# Patient Record
Sex: MALE | Race: OTHER | HISPANIC OR LATINO | Employment: UNEMPLOYED | URBAN - METROPOLITAN AREA
[De-identification: names, ages, dates, MRNs, and addresses within clinical notes are randomized per-mention and may not be internally consistent; named-entity substitution may affect disease eponyms.]

---

## 2021-09-24 ENCOUNTER — APPOINTMENT (EMERGENCY)
Dept: RADIOLOGY | Facility: HOSPITAL | Age: 76
End: 2021-09-24
Payer: COMMERCIAL

## 2021-09-24 ENCOUNTER — HOSPITAL ENCOUNTER (EMERGENCY)
Facility: HOSPITAL | Age: 76
Discharge: HOME | End: 2021-09-24
Attending: EMERGENCY MEDICINE | Admitting: EMERGENCY MEDICINE
Payer: COMMERCIAL

## 2021-09-24 VITALS
HEART RATE: 62 BPM | SYSTOLIC BLOOD PRESSURE: 175 MMHG | OXYGEN SATURATION: 98 % | WEIGHT: 160.27 LBS | DIASTOLIC BLOOD PRESSURE: 78 MMHG | TEMPERATURE: 98.9 F | RESPIRATION RATE: 18 BRPM

## 2021-09-24 DIAGNOSIS — N17.9 AKI (ACUTE KIDNEY INJURY) (HCC): ICD-10-CM

## 2021-09-24 DIAGNOSIS — U07.1 COVID-19 VIRUS INFECTION: Primary | ICD-10-CM

## 2021-09-24 DIAGNOSIS — I10 HYPERTENSION: ICD-10-CM

## 2021-09-24 DIAGNOSIS — R73.9 HYPERGLYCEMIA: ICD-10-CM

## 2021-09-24 LAB
ALBUMIN SERPL BCP-MCNC: 3.6 G/DL (ref 3.5–5)
ALP SERPL-CCNC: 75 U/L (ref 46–116)
ALT SERPL W P-5'-P-CCNC: 26 U/L (ref 12–78)
ANION GAP SERPL CALCULATED.3IONS-SCNC: 7 MMOL/L (ref 4–13)
AST SERPL W P-5'-P-CCNC: 21 U/L (ref 5–45)
BACTERIA UR QL AUTO: ABNORMAL /HPF
BASOPHILS # BLD AUTO: 0.02 THOUSANDS/ΜL (ref 0–0.1)
BASOPHILS NFR BLD AUTO: 0 % (ref 0–1)
BILIRUB SERPL-MCNC: 0.22 MG/DL (ref 0.2–1)
BILIRUB UR QL STRIP: NEGATIVE
BUN SERPL-MCNC: 34 MG/DL (ref 5–25)
CALCIUM SERPL-MCNC: 9 MG/DL (ref 8.3–10.1)
CHLORIDE SERPL-SCNC: 100 MMOL/L (ref 100–108)
CLARITY UR: CLEAR
CO2 SERPL-SCNC: 27 MMOL/L (ref 21–32)
COLOR UR: YELLOW
CREAT SERPL-MCNC: 1.73 MG/DL (ref 0.6–1.3)
EOSINOPHIL # BLD AUTO: 0.09 THOUSAND/ΜL (ref 0–0.61)
EOSINOPHIL NFR BLD AUTO: 1 % (ref 0–6)
ERYTHROCYTE [DISTWIDTH] IN BLOOD BY AUTOMATED COUNT: 13 % (ref 11.6–15.1)
GFR SERPL CREATININE-BSD FRML MDRD: 37 ML/MIN/1.73SQ M
GLUCOSE SERPL-MCNC: 237 MG/DL (ref 65–140)
GLUCOSE UR STRIP-MCNC: ABNORMAL MG/DL
HCT VFR BLD AUTO: 41.6 % (ref 36.5–49.3)
HGB BLD-MCNC: 13.5 G/DL (ref 12–17)
HGB UR QL STRIP.AUTO: ABNORMAL
IMM GRANULOCYTES # BLD AUTO: 0.02 THOUSAND/UL (ref 0–0.2)
IMM GRANULOCYTES NFR BLD AUTO: 0 % (ref 0–2)
KETONES UR STRIP-MCNC: NEGATIVE MG/DL
LEUKOCYTE ESTERASE UR QL STRIP: NEGATIVE
LIPASE SERPL-CCNC: 200 U/L (ref 73–393)
LYMPHOCYTES # BLD AUTO: 1.36 THOUSANDS/ΜL (ref 0.6–4.47)
LYMPHOCYTES NFR BLD AUTO: 19 % (ref 14–44)
MCH RBC QN AUTO: 28.5 PG (ref 26.8–34.3)
MCHC RBC AUTO-ENTMCNC: 32.5 G/DL (ref 31.4–37.4)
MCV RBC AUTO: 88 FL (ref 82–98)
MONOCYTES # BLD AUTO: 0.71 THOUSAND/ΜL (ref 0.17–1.22)
MONOCYTES NFR BLD AUTO: 10 % (ref 4–12)
NEUTROPHILS # BLD AUTO: 4.98 THOUSANDS/ΜL (ref 1.85–7.62)
NEUTS SEG NFR BLD AUTO: 70 % (ref 43–75)
NITRITE UR QL STRIP: NEGATIVE
NON-SQ EPI CELLS URNS QL MICRO: ABNORMAL /HPF
NRBC BLD AUTO-RTO: 0 /100 WBCS
PH UR STRIP.AUTO: 5.5 [PH] (ref 4.5–8)
PLATELET # BLD AUTO: 167 THOUSANDS/UL (ref 149–390)
PMV BLD AUTO: 11.5 FL (ref 8.9–12.7)
POTASSIUM SERPL-SCNC: 5.1 MMOL/L (ref 3.5–5.3)
PROT SERPL-MCNC: 7.5 G/DL (ref 6.4–8.2)
PROT UR STRIP-MCNC: ABNORMAL MG/DL
RBC # BLD AUTO: 4.73 MILLION/UL (ref 3.88–5.62)
RBC #/AREA URNS AUTO: ABNORMAL /HPF
SARS-COV-2 RNA RESP QL NAA+PROBE: POSITIVE
SODIUM SERPL-SCNC: 134 MMOL/L (ref 136–145)
SP GR UR STRIP.AUTO: 1.02 (ref 1–1.03)
TROPONIN I SERPL-MCNC: <0.02 NG/ML
UROBILINOGEN UR QL STRIP.AUTO: 0.2 E.U./DL
WBC # BLD AUTO: 7.18 THOUSAND/UL (ref 4.31–10.16)
WBC #/AREA URNS AUTO: ABNORMAL /HPF

## 2021-09-24 PROCEDURE — 71045 X-RAY EXAM CHEST 1 VIEW: CPT

## 2021-09-24 PROCEDURE — 99285 EMERGENCY DEPT VISIT HI MDM: CPT | Performed by: PHYSICIAN ASSISTANT

## 2021-09-24 PROCEDURE — 85025 COMPLETE CBC W/AUTO DIFF WBC: CPT | Performed by: PHYSICIAN ASSISTANT

## 2021-09-24 PROCEDURE — 99284 EMERGENCY DEPT VISIT MOD MDM: CPT

## 2021-09-24 PROCEDURE — 93005 ELECTROCARDIOGRAM TRACING: CPT

## 2021-09-24 PROCEDURE — 84484 ASSAY OF TROPONIN QUANT: CPT | Performed by: PHYSICIAN ASSISTANT

## 2021-09-24 PROCEDURE — 36415 COLL VENOUS BLD VENIPUNCTURE: CPT | Performed by: PHYSICIAN ASSISTANT

## 2021-09-24 PROCEDURE — 83690 ASSAY OF LIPASE: CPT | Performed by: PHYSICIAN ASSISTANT

## 2021-09-24 PROCEDURE — 81001 URINALYSIS AUTO W/SCOPE: CPT

## 2021-09-24 PROCEDURE — U0003 INFECTIOUS AGENT DETECTION BY NUCLEIC ACID (DNA OR RNA); SEVERE ACUTE RESPIRATORY SYNDROME CORONAVIRUS 2 (SARS-COV-2) (CORONAVIRUS DISEASE [COVID-19]), AMPLIFIED PROBE TECHNIQUE, MAKING USE OF HIGH THROUGHPUT TECHNOLOGIES AS DESCRIBED BY CMS-2020-01-R: HCPCS | Performed by: PHYSICIAN ASSISTANT

## 2021-09-24 PROCEDURE — 80053 COMPREHEN METABOLIC PANEL: CPT | Performed by: PHYSICIAN ASSISTANT

## 2021-09-24 PROCEDURE — U0005 INFEC AGEN DETEC AMPLI PROBE: HCPCS | Performed by: PHYSICIAN ASSISTANT

## 2021-09-24 RX ORDER — MULTIVITAMIN
1 TABLET ORAL DAILY
Qty: 30 TABLET | Refills: 0 | Status: SHIPPED | OUTPATIENT
Start: 2021-09-24

## 2021-09-24 RX ORDER — MELATONIN
2000 DAILY
Qty: 30 TABLET | Refills: 0 | Status: SHIPPED | OUTPATIENT
Start: 2021-09-24

## 2021-09-24 RX ORDER — MULTIVIT WITH MINERALS/LUTEIN
1000 TABLET ORAL DAILY
Qty: 30 TABLET | Refills: 0 | Status: SHIPPED | OUTPATIENT
Start: 2021-09-24

## 2021-09-25 LAB
ATRIAL RATE: 63 BPM
P AXIS: 52 DEGREES
PR INTERVAL: 160 MS
QRS AXIS: 50 DEGREES
QRSD INTERVAL: 82 MS
QT INTERVAL: 390 MS
QTC INTERVAL: 399 MS
T WAVE AXIS: 58 DEGREES
VENTRICULAR RATE: 63 BPM

## 2021-09-25 PROCEDURE — 93010 ELECTROCARDIOGRAM REPORT: CPT | Performed by: INTERNAL MEDICINE

## 2021-09-25 NOTE — ED PROVIDER NOTES
History  Chief Complaint   Patient presents with    Abdominal Pain     pt  reports abdominal pain, fatique and fever for the past three days  pt  denies N/V/D  Patient is a 67 y/o male with hx of HTN and DM, presents to the ED for evaluation of fever, cough, body aches, fatigue, abdominal pain  Pt with son who is translating for Nigerian speaking patient states patient began with symptoms 3 days ago  Pt is vaccinated against COVID, no known sick contacts  Pt reports diffuse abdominal pain when he coughs  Pt reports mild chest discomfort as well when he coughs  Pt denies hemoptysis  Pt has had decreased appetite and oral intake  Pt without SOB, vomiting, diarrhea, headache, vision changes, focal weakness, sensory deficit, dizziness  None       Past Medical History:   Diagnosis Date    Diabetes mellitus ([facility] [address] )     Hypertension        History reviewed  No pertinent surgical history  History reviewed  No pertinent family history  I have reviewed and agree with the history as documented  E-Cigarette/Vaping     E-Cigarette/Vaping Substances     Social History     Tobacco Use    Smoking status: Never Smoker    Smokeless tobacco: Never Used   Substance Use Topics    Alcohol use: Not Currently    Drug use: Not Currently       Review of Systems   Constitutional: Positive for appetite change, fatigue and fever  HENT: Negative for congestion, ear pain and sore throat  Eyes: Negative for visual disturbance  Respiratory: Positive for cough  Negative for shortness of breath  Cardiovascular: Negative for chest pain  Gastrointestinal: Positive for abdominal pain (when coughs)  Negative for diarrhea, nausea and vomiting  Genitourinary: Negative for dysuria, flank pain and hematuria  Musculoskeletal: Negative for back pain and neck pain  Skin: Negative for rash  Neurological: Negative for dizziness, speech difficulty, weakness and headaches     Psychiatric/Behavioral: Negative for confusion  Physical Exam  Physical Exam  Constitutional:       General: He is not in acute distress  Appearance: He is well-developed  He is not ill-appearing, toxic-appearing or diaphoretic  HENT:      Head: Normocephalic and atraumatic  Right Ear: External ear normal       Left Ear: External ear normal       Nose: Nose normal       Mouth/Throat:      Lips: Pink  Mouth: Mucous membranes are moist    Eyes:      Extraocular Movements: Extraocular movements intact  Conjunctiva/sclera: Conjunctivae normal    Cardiovascular:      Rate and Rhythm: Normal rate and regular rhythm  Pulmonary:      Effort: Pulmonary effort is normal  No tachypnea or respiratory distress  Breath sounds: Normal breath sounds  No decreased breath sounds, wheezing, rhonchi or rales  Abdominal:      General: Abdomen is flat  There is no distension  Palpations: Abdomen is soft  Tenderness: There is no abdominal tenderness  There is no guarding or rebound  Musculoskeletal:      Cervical back: Normal range of motion and neck supple  Right lower leg: Normal  No swelling, tenderness or bony tenderness  No edema  Left lower leg: Normal  No swelling, tenderness or bony tenderness  No edema  Skin:     General: Skin is warm and dry  Capillary Refill: Capillary refill takes less than 2 seconds  Coloration: Skin is not jaundiced or pale  Findings: No rash  Neurological:      Mental Status: He is alert and oriented to person, place, and time  GCS: GCS eye subscore is 4  GCS verbal subscore is 5  GCS motor subscore is 6     Psychiatric:         Mood and Affect: Mood and affect normal          Speech: Speech normal          Vital Signs  ED Triage Vitals [09/24/21 1831]   Temperature Pulse Respirations Blood Pressure SpO2   98 9 °F (37 2 °C) 69 20 (!) 182/79 98 %      Temp Source Heart Rate Source Patient Position - Orthostatic VS BP Location FiO2 (%)   Oral Monitor Sitting Right arm --      Pain Score       7           Vitals:    09/24/21 1831 09/24/21 2034 09/24/21 2253   BP: (!) 182/79 (!) 194/84 (!) 175/78   Pulse: 69 67 62   Patient Position - Orthostatic VS: Sitting Sitting Lying         Visual Acuity      ED Medications  Medications - No data to display    Diagnostic Studies  Results Reviewed     Procedure Component Value Units Date/Time    Comprehensive metabolic panel [[id number]]  (Abnormal) Collected: 09/24/21 2124    Lab Status: Final result Specimen: Blood from Hand, Right Updated: 09/24/21 2228     Sodium 134 mmol/L      Potassium 5 1 mmol/L      Chloride 100 mmol/L      CO2 27 mmol/L      ANION GAP 7 mmol/L      BUN 34 mg/dL      Creatinine 1 73 mg/dL      Glucose 237 mg/dL      Calcium 9 0 mg/dL      AST 21 U/L      ALT 26 U/L      Alkaline Phosphatase 75 U/L      Total Protein 7 5 g/dL      Albumin 3 6 g/dL      Total Bilirubin 0 22 mg/dL      eGFR 37 ml/min/1 73sq m     Narrative:      [place] guidelines for Chronic Kidney Disease (CKD):     Stage 1 with normal or high GFR (GFR > 90 mL/min/1 73 square meters)    Stage 2 Mild CKD (GFR = 60-89 mL/min/1 73 square meters)    Stage 3A Moderate CKD (GFR = 45-59 mL/min/1 73 square meters)    Stage 3B Moderate CKD (GFR = 30-44 mL/min/1 73 square meters)    Stage 4 Severe CKD (GFR = 15-29 mL/min/1 73 square meters)    Stage 5 End Stage CKD (GFR <15 mL/min/1 73 square meters)  Note: GFR calculation is accurate only with a steady state creatinine    Lipase [[id number]]  (Normal) Collected: 09/24/21 2124    Lab Status: Final result Specimen: Blood from Hand, Right Updated: 09/24/21 2228     Lipase 200 u/L     Urine Microscopic [[id number]]  (Abnormal) Collected: 09/24/21 2050    Lab Status: Final result Specimen: Urine, Clean Catch Updated: 09/24/21 2145     RBC, UA 0-1 /hpf      WBC, UA 0-1 /hpf      Epithelial Cells None Seen /hpf      Bacteria, UA None Seen /hpf     Novel Coronavirus (Covid-19),PCR [facility] - 2 Hour Stat [[id number]]  (Abnormal) Collected: 09/24/21 2046    Lab Status: Final result Specimen: Nasopharyngeal Swab Updated: 09/24/21 2125     SARS-CoV-2 Positive    Narrative:      FOR PEDIATRIC PATIENTS - copy/paste COVID Guidelines URL to browser: [email or web address]  [employer]    The specimen collection materials, transport medium, and/or testing methodology utilized in the production of these test results have been proven to be reliable in a limited validation with an abbreviated program under the Emergency Utilization Authorization provided by the FDA  Testing reported as "Presumptive positive" will be confirmed with secondary testing to ensure result accuracy  Clinical caution and judgement should be used with the interpretation of these results with consideration of the clinical impression and other laboratory testing  Testing reported as "Positive" or "Negative" has been proven to be accurate according to standard laboratory validation requirements  All testing is performed with control materials showing appropriate reactivity at standard intervals      Troponin I [[id number]]  (Normal) Collected: 09/24/21 2045    Lab Status: Final result Specimen: Blood from Hand, Right Updated: 09/24/21 2112     Troponin I <0 02 ng/mL     CBC and differential [[id number]] Collected: 09/24/21 2045    Lab Status: Final result Specimen: Blood from Hand, Right Updated: 09/24/21 2052     WBC 7 18 Thousand/uL      RBC 4 73 Million/uL      Hemoglobin 13 5 g/dL      Hematocrit 41 6 %      MCV 88 fL      MCH 28 5 pg      MCHC 32 5 g/dL      RDW 13 0 %      MPV 11 5 fL      Platelets 949 Thousands/uL      nRBC 0 /100 WBCs      Neutrophils Relative 70 %      Immat GRANS % 0 %      Lymphocytes Relative 19 %      Monocytes Relative 10 %      Eosinophils Relative 1 %      Basophils Relative 0 %      Neutrophils Absolute 4 98 Thousands/µL      Immature Grans Absolute 0 02 Thousand/uL      Lymphocytes Absolute 1 36 Thousands/µL      Monocytes Absolute 0 71 Thousand/µL      Eosinophils Absolute 0 09 Thousand/µL      Basophils Absolute 0 02 Thousands/µL     Urine Macroscopic, POC [[id number]]  (Abnormal) Collected: 09/24/21 2050    Lab Status: Final result Specimen: Urine Updated: 09/24/21 2051     Color, UA Yellow     Clarity, UA Clear     pH, UA 5 5     Leukocytes, UA Negative     Nitrite, UA Negative     Protein, UA Trace mg/dl      Glucose,  (1/2%) mg/dl      Ketones, UA Negative mg/dl      Urobilinogen, UA 0 2 E U /dl      Bilirubin, UA Negative     Blood, UA Trace     Specific [employer], UA 1 025    Narrative:      [facility] RESULT                 XR chest 1 view portable   Final Result by Deidra Whitney MD (09/25 1008)      Clear lungs  In the setting of clinically suspected/proven COVID-19, this plain film appearance does not contain findings that raise concern for viral pneumonia such as COVID-19, but does not rule out this diagnosis  Workstation performed: [id number]                    Procedures  ECG 12 Lead Documentation Only    Date/Time: 9/24/2021 9:11 PM  Performed by: Marge Brink PA-C  Authorized by: Marge Brink PA-C     Indications / Diagnosis:  Cp  ECG reviewed by me, the ED Provider: yes    Patient location:  ED  Previous ECG:     Previous ECG:  Unavailable    Comparison to cardiac monitor: Yes    Interpretation:     Interpretation: abnormal    Quality:     Tracing quality:  Limited by artifact  Rate:     ECG rate:  63    ECG rate assessment: normal    Rhythm:     Rhythm: sinus rhythm    Ectopy:     Ectopy: none    QRS:     QRS axis:  Normal    QRS intervals:  Normal  Conduction:     Conduction: normal    ST segments:     ST segments:  Normal  T waves:     T waves: normal    Comments:      QT/QTc: 390/399   No STEMI             ED Course  ED Course as of Sep 28 1056   Fri Sep 24, 2021   2135 SARS-COV-2(!): Positive SBIRT 22yo+      Most Recent Value   SBIRT (22 yo +)   In order to provide better care to our patients, we are screening all of our patients for alcohol and drug use  Would it be okay to ask you these screening questions? No Filed at: 09/24/2021 2016                    MDM  Number of Diagnoses or Management Options  MITCHELL (acute kidney injury) ([facility] [address] ): new and requires workup  COVID-19 virus infection: new and requires workup  Hyperglycemia: established and worsening  Hypertension: established and worsening  Diagnosis management comments: Patient is a 67 y/o male with hx of HTN and DM, presents to the ED for evaluation of fever, cough, body aches, fatigue, abdominal pain x3 days  EKG shows no ischemic changes, troponin negative  COVID positive  CXR shows no acute cardiopulmonary disease seen by me  Mild MITCHELL noted on labs, unsure of baseline, likely 2/2 dehydration as patient has not been wanting to eat/drink  Oxygen saturation 100%-98% on RA, no respiratory distress, no SOB   Will treat with vitamin C, vitamin D and multi-vitamin     Discussed reasons to come back to the ED including worsening SOB and low pulse ox <90%  Discussed supportive care, sleeping at an incline and self proning at home to help with breathing symptoms  Patient's blood pressure was noted to be elevated while in the ED  Pt did not take his home BP medications today yet  Advised patient they will need to f/u with PCP in 1 week for re-check of blood pressure for further evaluation and management of HTN  Discussed return precautions regarding blood pressure as well  Patient verbalizes understanding and agrees with plan  The management plan was discussed in detail with the patient at bedside and all questions were answered  Prior to discharge, I provided both verbal and written instructions  I discussed with the patient the signs and symptoms for which to return to the emergency department    All questions were answered and patient was comfortable with the plan of care and discharged to home  The patient agrees to return to the Emergency Department for concerns and/or progression of illness  Disposition  Final diagnoses:   COVID-19 virus infection   [facility]   Hyperglycemia   Hypertension     Time reflects when diagnosis was documented in both MDM as applicable and the Disposition within this note     Time User Action Codes Description Comment    9/24/2021 11:19 PM Cornelia McAlpin Add [U07 1] COVID-19 virus infection     9/24/2021 11:19 PM Cornelia McAlpin Add [N17 9] MITCHELL (acute kidney injury) ([facility] [address] )     9/24/2021 11:20 PM Cornelia McAlpin Add [R73 9] Hyperglycemia     9/24/2021 11:20 PM [facility] Hypertension       ED Disposition     ED Disposition Condition Date/Time Comment    Discharge Stable Fri Sep 24, 2021 11:19 PM [address] discharge to home/self care  Follow-up Information     Follow up With Specialties Details Why [address] [place] Emergency Department Emergency Medicine Go to  If symptoms worsen [facility] [id number]  [address] [place] [address] Emergency Department, [address], [place], [address]          Discharge Medication List as of 9/24/2021 11:22 PM      START taking these medications    Details   Ascorbic Acid (vitamin C) 1000 MG tablet Take 1 tablet (1,000 mg total) by mouth daily, Starting Fri 9/24/2021, Print      cholecalciferol (VITAMIN D3) 1,000 units tablet Take 2 tablets (2,000 Units total) by mouth daily, Starting Fri 9/24/2021, Print      Multiple Vitamin (multivitamin) tablet Take 1 tablet by mouth daily, Starting Fri 9/24/2021, Print           No discharge procedures on file      PDMP Review     None          ED Provider  Electronically Signed by           Rohit Oquendo PA-C  09/28/21 2757

## 2021-09-25 NOTE — DISCHARGE INSTRUCTIONS
Discussed reasons to come back to the ED including worsening SOB and low pulse ox <90%  Discussed supportive care, sleeping at an incline and self proning at home to help with breathing symptoms  Your blood pressure was elevated in Emergency Department today  Please follow-up with your Primary Care Provider for re-evaluation of elevated blood pressure